# Patient Record
Sex: FEMALE | Race: WHITE | Employment: UNEMPLOYED | URBAN - METROPOLITAN AREA
[De-identification: names, ages, dates, MRNs, and addresses within clinical notes are randomized per-mention and may not be internally consistent; named-entity substitution may affect disease eponyms.]

---

## 2024-04-01 ENCOUNTER — HOSPITAL ENCOUNTER (OUTPATIENT)
Dept: CT IMAGING | Age: 49
Discharge: HOME | End: 2024-04-01
Attending: INTERNAL MEDICINE
Payer: COMMERCIAL

## 2024-04-01 DIAGNOSIS — R63.4 WEIGHT LOSS: ICD-10-CM

## 2024-04-01 DIAGNOSIS — K51.00 ULCERATIVE CHRONIC PANCOLITIS WITHOUT COMPLICATIONS (HCC): ICD-10-CM

## 2024-04-01 PROCEDURE — 6360000004 HC RX CONTRAST MEDICATION: Performed by: INTERNAL MEDICINE

## 2024-04-01 PROCEDURE — 74177 CT ABD & PELVIS W/CONTRAST: CPT

## 2024-04-01 RX ADMIN — DIATRIZOATE MEGLUMINE AND DIATRIZOATE SODIUM 20 ML: 660; 100 LIQUID ORAL; RECTAL at 07:54

## 2024-04-01 RX ADMIN — IOPAMIDOL 75 ML: 755 INJECTION, SOLUTION INTRAVENOUS at 07:55

## 2024-04-23 ENCOUNTER — TELEPHONE (OUTPATIENT)
Dept: ENDOCRINOLOGY | Age: 49
End: 2024-04-23

## 2024-04-23 NOTE — TELEPHONE ENCOUNTER
Fax from [facility]  Referral to endo    Dx-lesion of adrenal gland  Cherie Juarez

## 2024-05-23 ENCOUNTER — OFFICE VISIT (OUTPATIENT)
Dept: ENDOCRINOLOGY | Age: 49
End: 2024-05-23
Payer: COMMERCIAL

## 2024-05-23 VITALS
WEIGHT: 144 LBS | HEIGHT: 63 IN | TEMPERATURE: 98 F | SYSTOLIC BLOOD PRESSURE: 98 MMHG | OXYGEN SATURATION: 99 % | DIASTOLIC BLOOD PRESSURE: 57 MMHG | HEART RATE: 55 BPM | BODY MASS INDEX: 25.52 KG/M2

## 2024-05-23 DIAGNOSIS — D35.02 ADENOMA OF LEFT ADRENAL GLAND: Primary | ICD-10-CM

## 2024-05-23 DIAGNOSIS — E28.39 MENOPAUSE OVARIAN FAILURE: ICD-10-CM

## 2024-05-23 PROCEDURE — 99205 OFFICE O/P NEW HI 60 MIN: CPT | Performed by: INTERNAL MEDICINE

## 2024-05-23 RX ORDER — DIAZEPAM 5 MG/1
10 TABLET ORAL EVERY 12 HOURS PRN
COMMUNITY

## 2024-05-23 RX ORDER — M-VIT,TX,IRON,MINS/CALC/FOLIC 27MG-0.4MG
1 TABLET ORAL DAILY
COMMUNITY

## 2024-05-23 RX ORDER — ATORVASTATIN CALCIUM 10 MG/1
10 TABLET, FILM COATED ORAL DAILY
COMMUNITY
Start: 2024-04-01

## 2024-05-23 RX ORDER — VITAMIN B COMPLEX
1 CAPSULE ORAL DAILY
COMMUNITY

## 2024-05-23 RX ORDER — ESTRADIOL 0.03 MG/D
1 FILM, EXTENDED RELEASE TRANSDERMAL
Qty: 8 PATCH | Refills: 3 | Status: SHIPPED | OUTPATIENT
Start: 2024-05-23

## 2024-05-23 NOTE — PROGRESS NOTES
[facility]  Initial Patient Visit        Patient:  Hazel Otero                                               : 1975    MRN: [id number]  Date : 2024        CHIEF COMPLAINT:   Chief Complaint   Patient presents with    New Patient    Other     Adrenal gland        HISTORY OF PRESENT ILLNESS:   Hazel Otero is a pleasant 48 y.o. female who presents for evaluation of left adrenal nodule.  Patient has history of ulcerative colitis, hysterectomy and has a BMI of 25.  She is accompanied by her .    I reviewed outside records.    CT abd completed 2019 with RLQ pain when she was noted to have sigmoid diverticulitis.     Incidentally noted 1.5cm L sided adrenal lesion on CT. no prior history of hypertension.  No history of hypokalemia.  She initially had no abnormal weight changes.  No muscle weakness, bruising, striae, HA, visions changes, flushing, spells.  She had prior history of prediabetes.  Biochemical evaluation completed in  showed normal urine metanephrines and cortisol.  Dexamethasone suppression test was completed which was normal.  Gatito 18 and renin 3.55 2019  Ct abd reviewed from 2020: Stable findings.  Repeat CT scan in 2020:  There is a 1.5 x 1.1 cm oval left adrenal lesion. This has   noncontrast Hounsfield units of 26, portal venous phase Hounsfield units   of 68, and delayed Hounsfield units of 40. The calculated absolute    washout of contrast is 67%, which is consistent with an adrenal adenoma.   The right adrenal gland is normal.    Patient was previously advised that this is a nonfunctional benign adrenal nodule.  More recently, she had recent changes in mood instability, being emotional, fatigued with brain fog.  Symptoms have been present over the past 1 to 2 years.  She wondered if she was going through menopause but also wondered if this is related to adrenal nodule.  GI completed CT of the abdomen and pelvis which really demonstrated left adrenal

## 2024-06-17 DIAGNOSIS — D35.02 ADENOMA OF LEFT ADRENAL GLAND: ICD-10-CM

## 2024-06-18 LAB
COLLECT DURATION TIME UR: 24 HR
CREAT 24H UR-MRATE: 1.5 G/24HR (ref 0.6–1.5)
CREAT CL 24H UR+SERPL-VRATE: 131 ML/MIN (ref 88–128)
CREAT SERPL-MCNC: 0.8 MG/DL (ref 0.6–1.2)
SPECIMEN VOL 24H UR: 1300 ML

## 2024-06-21 LAB
METANEPH 24H UR-MCNC: 133 UG/L
METANEPH 24H UR-MRATE: 173 UG/D (ref 36–229)
METANEPH+NORMETANEPH UR-IMP: NORMAL
METANEPH/CREAT 24H UR: 118 UG/G CRT (ref 0–300)
NORMETANEPHRINE 24H UR-MCNC: 183 UG/L
NORMETANEPHRINE 24H UR-MRATE: 238 UG/D (ref 95–650)
NORMETANEPHRINE/CREAT 24H UR: 162 UG/G CRT (ref 0–400)

## 2024-06-23 LAB
COLLECT DURATION TIME SPEC: 24 H
CORTIS F 24H UR HPLC-MCNC: 35.6 UG/L
CORTIS F 24H UR-MRATE: 46.3 UG/D
CORTIS F/CREAT 24H UR: 31.5 UG/G CRT
CREAT 24H UR-MCNC: 113 MG/DL
CREAT 24H UR-MRATE: 1469 MG/D (ref 700–1600)
IMP & REVIEW OF LAB RESULTS: ABNORMAL
SPECIMEN VOL ?TM UR: 1300 ML

## 2024-06-26 ENCOUNTER — PATIENT MESSAGE (OUTPATIENT)
Dept: ENDOCRINOLOGY | Age: 49
End: 2024-06-26

## 2024-06-26 NOTE — TELEPHONE ENCOUNTER
From: Hazel Otero  To: Dr. Kelsea Rivera  Sent: 6/26/2024 9:13 AM EDT  Subject: Test Results    Good morning, I was just checking in...to see if all my tests are in and what you think about them? Also if you were able to get all my other lab work from Primary care      Thank you,  Hazel Vidales

## 2024-07-02 DIAGNOSIS — D35.02 ADENOMA OF LEFT ADRENAL GLAND: Primary | ICD-10-CM

## 2024-07-02 DIAGNOSIS — E28.39 MENOPAUSE OVARIAN FAILURE: ICD-10-CM

## 2024-07-02 RX ORDER — ESTRADIOL 0.04 MG/D
1 PATCH, EXTENDED RELEASE TRANSDERMAL
Qty: 8 PATCH | Refills: 3 | Status: SHIPPED | OUTPATIENT
Start: 2024-07-04

## 2024-08-07 ENCOUNTER — PATIENT MESSAGE (OUTPATIENT)
Dept: ENDOCRINOLOGY | Age: 49
End: 2024-08-07

## 2024-08-07 ENCOUNTER — HOSPITAL ENCOUNTER (OUTPATIENT)
Dept: CT IMAGING | Age: 49
Discharge: HOME | End: 2024-08-07
Attending: INTERNAL MEDICINE
Payer: COMMERCIAL

## 2024-08-07 DIAGNOSIS — D35.02 ADENOMA OF LEFT ADRENAL GLAND: ICD-10-CM

## 2024-08-07 DIAGNOSIS — E28.39 MENOPAUSE OVARIAN FAILURE: Primary | ICD-10-CM

## 2024-08-07 PROCEDURE — 6360000004 HC RX CONTRAST MEDICATION: Performed by: INTERNAL MEDICINE

## 2024-08-07 PROCEDURE — 74178 CT ABD&PLV WO CNTR FLWD CNTR: CPT

## 2024-08-07 RX ORDER — ESTRADIOL 0.03 MG/D
1 FILM, EXTENDED RELEASE TRANSDERMAL
Qty: 8 PATCH | Refills: 3 | Status: SHIPPED | OUTPATIENT
Start: 2024-08-08

## 2024-08-07 RX ORDER — ESTRADIOL 0.03 MG/D
1 FILM, EXTENDED RELEASE TRANSDERMAL
Qty: 8 PATCH | Refills: 3 | Status: SHIPPED | OUTPATIENT
Start: 2024-08-08 | End: 2024-08-07

## 2024-08-07 RX ORDER — ESTRADIOL 0.03 MG/D
1 FILM, EXTENDED RELEASE TRANSDERMAL
Qty: 8 PATCH | Refills: 3 | Status: SHIPPED | OUTPATIENT
Start: 2024-08-08 | End: 2024-08-07 | Stop reason: SDUPTHER

## 2024-08-07 RX ADMIN — IOPAMIDOL 75 ML: 755 INJECTION, SOLUTION INTRAVENOUS at 17:38

## 2024-08-07 NOTE — TELEPHONE ENCOUNTER
From: Hazel Otero  To: Dr. Kelsea Rivera  Sent: 8/7/2024 10:36 AM EDT  Subject: Estrogen patch    Good morning, I had started the .03 dose 4 patches ago, I think I need to go back to the simplest dose, my emotions are too out of wack, I have woke up crying now for 3 days, I had felt a little better before jumping up higher so soon maybe? Pharmacy said you would need to prescribe it again ad they had to cancel it for insurance to fill higher dose. I see you on the 22nd.     Thank you,  Hazel

## 2024-08-22 ENCOUNTER — OFFICE VISIT (OUTPATIENT)
Dept: ENDOCRINOLOGY | Age: 49
End: 2024-08-22
Payer: COMMERCIAL

## 2024-08-22 VITALS
WEIGHT: 152 LBS | HEART RATE: 78 BPM | BODY MASS INDEX: 26.93 KG/M2 | DIASTOLIC BLOOD PRESSURE: 72 MMHG | SYSTOLIC BLOOD PRESSURE: 118 MMHG | HEIGHT: 63 IN

## 2024-08-22 DIAGNOSIS — E28.39 MENOPAUSE OVARIAN FAILURE: ICD-10-CM

## 2024-08-22 DIAGNOSIS — D35.02 ADENOMA OF LEFT ADRENAL GLAND: Primary | ICD-10-CM

## 2024-08-22 PROCEDURE — 99214 OFFICE O/P EST MOD 30 MIN: CPT | Performed by: INTERNAL MEDICINE

## 2024-08-22 NOTE — PROGRESS NOTES
The celiac axis,  SMA and ROLLY are patent.  The portal venous system is patent.  No ascites or  drainable fluid collection.  No pathologically enlarged adenopathy.     Bones/Soft Tissues: No acute findings in the bones or soft tissues.     IMPRESSION:  1. Stable 1.4 cm left adrenal adenoma.  2. No acute findings in the abdomen or pelvis.         Assessment/Plan:   1. Adenoma of left adrenal gland.  Noted in 2019, appears to be stable since that time in size.  Prior CT scan showed absolute washout of more than 65% suggestive of adrenal adenoma.  Biochemical evaluation in the past was reassuring.  Repeat testing confirmed that this is not functional.  CT scan showed stability of left nodule.     No further evaluation is needed for this unless in the rare event of development of new onset hyperglycemia, hypertension or significant weight changes.      2. Menopause ovarian failure, mood changes, fatigue, brain fog.  Suspect symptoms to be related to menopause.   Biochemical evaluation showed elevated LH and FSH.  Overall tolerating estradiol patch as well.  Will continue with current dose.  Would recommend to establish care with OB/GYN to continue with regular mammograms and physical exams.  No need for progesterone given prior hysterectomy.        Return in about 1 year (around 8/22/2025) for Menopause .    Kelsea Rivera MD   3:54 PM  8/22/2024    Time spent 60 minutes.    Thank you very much for allowing me to take care of this patient along with you.    Comment: This documentation utilized a software-based speech recognition technology (voice-to-text process), where occasional errors in transcription can occur.

## 2024-11-29 DIAGNOSIS — E28.39 MENOPAUSE OVARIAN FAILURE: ICD-10-CM

## 2024-12-01 DIAGNOSIS — E28.39 MENOPAUSE OVARIAN FAILURE: ICD-10-CM

## 2024-12-02 RX ORDER — ESTRADIOL 0.03 MG/D
1 FILM, EXTENDED RELEASE TRANSDERMAL
Qty: 8 PATCH | Refills: 3 | Status: SHIPPED | OUTPATIENT
Start: 2024-12-02

## 2024-12-02 RX ORDER — ESTRADIOL 0.03 MG/D
FILM, EXTENDED RELEASE TRANSDERMAL
Qty: 8 PATCH | Refills: 3 | OUTPATIENT
Start: 2024-12-02

## 2024-12-02 NOTE — TELEPHONE ENCOUNTER
Medication:   Requested Prescriptions     Signed Prescriptions Disp Refills    estradiol 0.025 MG/24HR PTTW 8 patch 3     Sig: Place 1 patch onto the skin Twice a Week     Authorizing Provider: EVELYNE GRIFFIN     Ordering User: ERINN DOWNS     Last Filled:      Last appt: 8/22/2024   Next appt: Visit date not found    Last Labs DM: No results found for: \"LABA1C\"  Last Lipid:   Lab Results   Component Value Date/Time    CHOL 147 08/11/2017 09:22 AM    TRIG 116 08/11/2017 09:22 AM    HDL 46 08/11/2017 09:22 AM     Last PSA: No results found for: \"PSA\"  Last Thyroid:   Lab Results   Component Value Date/Time    TSH 2.89 05/10/2017 09:10 AM

## 2025-02-26 ENCOUNTER — OFFICE VISIT (OUTPATIENT)
Age: 50
End: 2025-02-26

## 2025-02-26 VITALS
HEIGHT: 63 IN | DIASTOLIC BLOOD PRESSURE: 75 MMHG | HEART RATE: 77 BPM | BODY MASS INDEX: 27.29 KG/M2 | TEMPERATURE: 99 F | SYSTOLIC BLOOD PRESSURE: 119 MMHG | OXYGEN SATURATION: 96 % | WEIGHT: 154 LBS

## 2025-02-26 DIAGNOSIS — L04.0 ACUTE CERVICAL LYMPHADENITIS: Primary | ICD-10-CM

## 2025-02-26 RX ORDER — FLUCONAZOLE 150 MG/1
150 TABLET ORAL
Qty: 2 TABLET | Refills: 0 | Status: SHIPPED | OUTPATIENT
Start: 2025-02-26 | End: 2025-03-04

## 2025-02-26 RX ORDER — AZITHROMYCIN 250 MG/1
TABLET, FILM COATED ORAL
Qty: 6 TABLET | Refills: 0 | Status: SHIPPED | OUTPATIENT
Start: 2025-02-26 | End: 2025-03-08

## 2025-02-26 ASSESSMENT — ENCOUNTER SYMPTOMS
SORE THROAT: 1
COUGH: 1

## 2025-02-26 NOTE — PROGRESS NOTES
Hazel Otero (:  1975) is a 49 y.o. female,New patient, here for evaluation of the following chief complaint(s):  Cough (Patient c/o cough, sore throat, swollen lymph nodes, fever and FLU exposure 2 weeks ago)      ASSESSMENT/PLAN:  1. Acute cervical lymphadenitis  - azithromycin (ZITHROMAX) 250 MG tablet; 500mg on day 1 followed by 250mg on days 2 - 5  Dispense: 6 tablet; Refill: 0       Return if symptoms worsen or fail to improve.    SUBJECTIVE/OBJECTIVE:  EXPOSED TO FLU 2 WEEKS AGO.WORRY AS LYMPH NODE SWOLLEN .FEELS A LITTLE UNCOMFORTABLE. A LITTLE FEVER.      History provided by:  Patient      Vitals:    25 1112   BP: 119/75   Site: Right Upper Arm   Position: Sitting   Cuff Size: Large Adult   Pulse: 77   Temp: 99 °F (37.2 °C)   TempSrc: Oral   SpO2: 96%   Weight: 69.9 kg (154 lb)   Height: 1.6 m (5' 3\")       Review of Systems   HENT:  Positive for sore throat.    Respiratory:  Positive for cough.        Physical Exam  Constitutional:       Appearance: Normal appearance.   HENT:      Head: Normocephalic and atraumatic.      Nose: Nose normal.      Mouth/Throat:      Mouth: Mucous membranes are moist.   Eyes:      Pupils: Pupils are equal, round, and reactive to light.   Pulmonary:      Effort: Pulmonary effort is normal.      Breath sounds: Normal breath sounds.   Abdominal:      General: Abdomen is flat. Bowel sounds are normal.      Palpations: Abdomen is soft.   Musculoskeletal:         General: Normal range of motion.      Cervical back: Normal range of motion and neck supple.   Lymphadenopathy:      Cervical: Cervical adenopathy present.   Skin:     General: Skin is warm.   Neurological:      Mental Status: She is alert and oriented to person, place, and time.   Psychiatric:         Mood and Affect: Mood normal.         Behavior: Behavior normal.           An electronic signature was used to authenticate this note.    --Sonia Hidalgo DO

## 2025-05-27 RX ORDER — ESTRADIOL 0.04 MG/D
PATCH, EXTENDED RELEASE TRANSDERMAL
Qty: 8 PATCH | Refills: 0 | Status: SHIPPED | OUTPATIENT
Start: 2025-05-27

## 2025-06-02 RX ORDER — ESTRADIOL 0.04 MG/D
PATCH, EXTENDED RELEASE TRANSDERMAL
Qty: 8 PATCH | Refills: 0 | Status: SHIPPED | OUTPATIENT
Start: 2025-06-02

## 2025-06-02 NOTE — TELEPHONE ENCOUNTER
Medication:   Requested Prescriptions     Signed Prescriptions Disp Refills    estradiol (VIVELLE) 0.0375 MG/24HR 8 patch 0     Sig: APPLY 1 PATCH TOPICALLY TO THE SKIN 2 TIMES A WEEK     Authorizing Provider: EVELYNE GRIFFIN     Ordering User: ERINN DOWNS         Last appt: 8/22/2024   Next appt: Visit date not found    Last Labs DM: No results found for: \"LABA1C\"